# Patient Record
Sex: MALE | Race: AMERICAN INDIAN OR ALASKA NATIVE
[De-identification: names, ages, dates, MRNs, and addresses within clinical notes are randomized per-mention and may not be internally consistent; named-entity substitution may affect disease eponyms.]

---

## 2021-11-17 ENCOUNTER — HOSPITAL ENCOUNTER (EMERGENCY)
Dept: HOSPITAL 5 - ED | Age: 46
LOS: 1 days | Discharge: HOME | End: 2021-11-18
Payer: COMMERCIAL

## 2021-11-17 VITALS — DIASTOLIC BLOOD PRESSURE: 89 MMHG | SYSTOLIC BLOOD PRESSURE: 132 MMHG

## 2021-11-17 DIAGNOSIS — Z91.013: ICD-10-CM

## 2021-11-17 DIAGNOSIS — Z20.9: Primary | ICD-10-CM

## 2021-11-17 PROCEDURE — 80074 ACUTE HEPATITIS PANEL: CPT

## 2021-11-17 PROCEDURE — 99283 EMERGENCY DEPT VISIT LOW MDM: CPT

## 2021-11-17 PROCEDURE — 87806 HIV AG W/HIV1&2 ANTB W/OPTIC: CPT

## 2021-11-17 PROCEDURE — 36415 COLL VENOUS BLD VENIPUNCTURE: CPT
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
## 2021-12-18 ENCOUNTER — HOSPITAL ENCOUNTER (EMERGENCY)
Dept: HOSPITAL 5 - ED | Age: 46
Discharge: HOME | End: 2021-12-18
Payer: COMMERCIAL

## 2021-12-18 VITALS — SYSTOLIC BLOOD PRESSURE: 124 MMHG | DIASTOLIC BLOOD PRESSURE: 84 MMHG

## 2021-12-18 DIAGNOSIS — M25.511: ICD-10-CM

## 2021-12-18 DIAGNOSIS — Z91.013: ICD-10-CM

## 2021-12-18 DIAGNOSIS — M19.011: Primary | ICD-10-CM

## 2021-12-18 DIAGNOSIS — E11.8: ICD-10-CM

## 2021-12-18 PROCEDURE — 99283 EMERGENCY DEPT VISIT LOW MDM: CPT

## 2021-12-18 NOTE — XRAY REPORT
XR shoulder 2+V RT



INDICATION / CLINICAL INFORMATION:

right shoulder pain.



COMPARISON:

None available.

 

FINDINGS:

BONES/JOINT(S): No acute fracture or subluxation. Moderate DJD in the AC joint.



SOFT TISSUES: No significant abnormality.



ADDITIONAL FINDINGS: None.







Signer Name: Jaskaran Turner MD 

Signed: 12/18/2021 5:01 PM

Workstation Name: [id number]
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
## 2022-01-15 ENCOUNTER — HOSPITAL ENCOUNTER (EMERGENCY)
Dept: HOSPITAL 5 - ED | Age: 47
Discharge: HOME | End: 2022-01-15
Payer: COMMERCIAL

## 2022-01-15 VITALS — SYSTOLIC BLOOD PRESSURE: 147 MMHG | DIASTOLIC BLOOD PRESSURE: 88 MMHG

## 2022-01-15 DIAGNOSIS — K02.9: Primary | ICD-10-CM

## 2022-01-15 DIAGNOSIS — E11.9: ICD-10-CM

## 2022-01-15 PROCEDURE — 99282 EMERGENCY DEPT VISIT SF MDM: CPT

## 2022-01-15 NOTE — EMERGENCY DEPARTMENT REPORT
ED General Adult HPI





- General


Chief complaint: Dental/Oral


Stated complaint: FACIAL SWELLING, POSS DENTAL


Time Seen by Provider: 01/15/22 20:53


Source: patient


Mode of arrival: Ambulatory


Limitations: No Limitations





- History of Present Illness


Initial comments: 


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  Patient 

states subjective facial swelling however no objective swelling noted no fever 

no chills no nausea or vomiting.  There is no throat or ear pain.  This is a 

chronic problem for this patient.  Patient states he has not seen a dentist.  

Symptoms are exacerbated by hot and cold stimuli.  Symptoms are relieved by 

nothing tried.








- Related Data


                                  Previous Rx's











 Medication  Instructions  Recorded  Last Taken  Type


 


Emtricitabine/Tenofovir (Tdf) 1 each PO DAILY #30 tablet 11/18/21 Unknown Rx





[Truvada 133 mg-200 mg Tablet]    


 


Meloxicam [Mobic] 7.5 mg PO QDAY #10 tablet 12/18/21 Unknown Rx


 


methOCARBAMOL [Robaxin TAB] 500 mg PO BID PRN #20 tab 12/18/21 Unknown Rx


 


Amoxicillin [Trimox CAP] 500 mg PO Q8H 7 Days #21 capsule 01/15/22 Unknown Rx


 


traMADoL [Ultram] 50 mg PO Q6HR PRN #12 tablet 01/15/22 Unknown Rx











                                    Allergies











Allergy/AdvReac Type Severity Reaction Status Date / Time


 


shellfish derived AdvReac  Anaphylaxis Verified 11/17/21 15:46














ED Review of Systems


ROS: 


Stated complaint: FACIAL SWELLING, POSS DENTAL


Other details as noted in HPI





Constitutional: denies: chills, fever


Eyes: denies: eye pain, eye discharge, vision change


ENT: dental pain.  denies: ear pain, throat pain, epistaxis, congestion


Respiratory: denies: cough, shortness of breath, wheezing


Cardiovascular: denies: chest pain, palpitations


Endocrine: no symptoms reported


Gastrointestinal: denies: abdominal pain, nausea, diarrhea


Genitourinary: denies: urgency, dysuria


Musculoskeletal: denies: back pain, joint swelling, arthralgia


Skin: denies: rash, lesions


Neurological: denies: headache, weakness, paresthesias


Psychiatric: denies: anxiety, depression


Hematological/Lymphatic: denies: easy bleeding, easy bruising





ED Past Medical Hx





- Past Medical History


Hx Diabetes: Yes





- Medications


Home Medications: 


                                Home Medications











 Medication  Instructions  Recorded  Confirmed  Last Taken  Type


 


Emtricitabine/Tenofovir (Tdf) 1 each PO DAILY #30 tablet 11/18/21  Unknown Rx





[Truvada 133 mg-200 mg Tablet]     


 


Meloxicam [Mobic] 7.5 mg PO QDAY #10 tablet 12/18/21  Unknown Rx


 


methOCARBAMOL [Robaxin TAB] 500 mg PO BID PRN #20 tab 12/18/21  Unknown Rx


 


Amoxicillin [Trimox CAP] 500 mg PO Q8H 7 Days #21 capsule 01/15/22  Unknown Rx


 


traMADoL [Ultram] 50 mg PO Q6HR PRN #12 tablet 01/15/22  Unknown Rx














ED Physical Exam





- General


Limitations: No Limitations


General appearance: alert, in no apparent distress





- Head


Head exam: Present: normocephalic, normal inspection





- Eye


Eye exam: Present: normal appearance, EOMI.  Absent: conjunctival injection, 

nystagmus


Pupils: Present: normal accommodation





- ENT


ENT exam: Present: mucous membranes moist, TM's normal bilaterally, normal 

external ear exam





- Expanded ENT Exam


  ** Expanded


Teeth exam: Present: dental caries (29 no focal abscess)


Throat exam: Positive: normal inspection.  Negative: tonsillar erythema, 

tonsillomegaly, tonsillar exudate





- Neck


Neck exam: Present: normal inspection, full ROM.  Absent: tenderness, 

lymphadenopathy, thyromegaly





- Respiratory


Respiratory exam: Present: normal lung sounds bilaterally.  Absent: respiratory 

distress, wheezes, stridor, chest wall tenderness





- Cardiovascular


Cardiovascular Exam: Present: regular rate, normal rhythm, normal heart sounds. 

 Absent: systolic murmur, diastolic murmur, rubs, gallop





- GI/Abdominal


GI/Abdominal exam: Present: soft, normal bowel sounds.  Absent: distended, 

tenderness





- Rectal


Rectal exam: Present: deferred





- Extremities Exam


Extremities exam: Present: normal inspection, full ROM





- Back Exam


Back exam: Present: normal inspection, full ROM.  Absent: tenderness





- Neurological Exam


Neurological exam: Present: alert, oriented X3, CN II-XII intact





- Psychiatric


Psychiatric exam: Present: normal affect, normal mood





- Skin


Skin exam: Present: warm, dry, intact, normal color.  Absent: rash





ED Course





                                   Vital Signs











  01/15/22





  20:40


 


Temperature 98.0 F


 


Pulse Rate 83


 


Respiratory 18





Rate 


 


Blood Pressure 147/88





[Right] 


 


O2 Sat by Pulse 100





Oximetry 














ED Medical Decision Making





- Medical Decision Making


This straightforward infected dental caries without dental abscess.  Plan 

antibiotics pain control follow-up with dentist on Tuesday as scheduled.  Return

 to emergency department if symptoms worsen.  Patient verbalized agreement 

understanding discharge plan patient will be DC'd to home in stable condition at

 this time patient is currently tolerating p.o. intake without symptoms.





Critical care attestation.: 


If time is entered above; I have spent that time in minutes in the direct care 

of this critically ill patient, excluding procedure time.








ED Disposition


Clinical Impression: 


 Dental caries





Disposition: 01 HOME / SELF CARE / HOMELESS


Is pt being admited?: No


Does the pt Need Aspirin: No


Condition: Stable


Instructions:  Preventive Dental Care, Adult


Additional Instructions: 


Medication as prescribed, follow-up with your dentist in 2 days as scheduled.  

Return to emergency department if symptoms worsen.


Prescriptions: 


Amoxicillin [Trimox CAP] 500 mg PO Q8H 7 Days #21 capsule


traMADoL [Ultram] 50 mg PO Q6HR PRN #12 tablet


 PRN Reason: Pain


Referrals: 


[facility] [Provider Group] - 3-5 Days


Forms:  Work/School Release Form(ED)


Time of Disposition: 21:02